# Patient Record
Sex: FEMALE | Race: WHITE | Employment: FULL TIME | URBAN - METROPOLITAN AREA
[De-identification: names, ages, dates, MRNs, and addresses within clinical notes are randomized per-mention and may not be internally consistent; named-entity substitution may affect disease eponyms.]

---

## 2020-10-08 ENCOUNTER — TELEPHONE (OUTPATIENT)
Dept: TRANSPLANT | Facility: CLINIC | Age: 60
End: 2020-10-08

## 2020-10-08 DIAGNOSIS — C92.02 ACUTE MYELOID LEUKEMIA IN RELAPSE (H): Primary | ICD-10-CM

## 2020-10-15 ENCOUNTER — ALLIED HEALTH/NURSE VISIT (OUTPATIENT)
Dept: TRANSPLANT | Facility: CLINIC | Age: 60
End: 2020-10-15
Attending: INTERNAL MEDICINE
Payer: COMMERCIAL

## 2020-10-15 VITALS — HEIGHT: 66 IN

## 2020-10-15 DIAGNOSIS — C92.02 ACUTE MYELOID LEUKEMIA IN RELAPSE (H): Primary | ICD-10-CM

## 2020-10-15 DIAGNOSIS — Z71.9 VISIT FOR COUNSELING: Primary | ICD-10-CM

## 2020-10-15 PROCEDURE — 99207 PR NO CHARGE LOS: CPT

## 2020-10-15 PROCEDURE — 99205 OFFICE O/P NEW HI 60 MIN: CPT | Performed by: INTERNAL MEDICINE

## 2020-10-15 PROCEDURE — G0463 HOSPITAL OUTPT CLINIC VISIT: HCPCS

## 2020-10-15 PROCEDURE — 999N000104 HC STATISTIC NO CHARGE: Mod: TEL

## 2020-10-15 PROCEDURE — 99207 PR NO CHARGE NURSE ONLY: CPT | Mod: TEL

## 2020-10-15 RX ORDER — ACYCLOVIR 400 MG/1
TABLET ORAL
COMMUNITY
Start: 2020-08-06

## 2020-10-15 RX ORDER — LEVOFLOXACIN 500 MG/1
500 TABLET, FILM COATED ORAL
COMMUNITY
Start: 2020-08-20

## 2020-10-15 RX ORDER — PENICILLIN V POTASSIUM 500 MG/1
500 TABLET, FILM COATED ORAL
COMMUNITY
Start: 2020-05-06

## 2020-10-15 RX ORDER — POSACONAZOLE 100 MG/1
200 TABLET, DELAYED RELEASE ORAL
COMMUNITY
Start: 2020-04-16

## 2020-10-15 RX ORDER — POTASSIUM CHLORIDE 1.5 G/1.58G
20 POWDER, FOR SOLUTION ORAL
COMMUNITY

## 2020-10-15 RX ORDER — ATOVAQUONE 750 MG/5ML
1500 SUSPENSION ORAL
COMMUNITY
Start: 2020-04-01

## 2020-10-15 RX ORDER — HYDROXYUREA 500 MG/1
500 CAPSULE ORAL
COMMUNITY
Start: 2020-10-09

## 2020-10-15 RX ORDER — CHLORHEXIDINE GLUCONATE ORAL RINSE 1.2 MG/ML
15 SOLUTION DENTAL
COMMUNITY
Start: 2020-08-19

## 2020-10-15 RX ORDER — BUDESONIDE 3 MG/1
CAPSULE, COATED PELLETS ORAL
COMMUNITY
Start: 2020-10-10 | End: 2020-10-15

## 2020-10-15 RX ORDER — DEXAMETHASONE 0.5 MG/5ML
SOLUTION ORAL
COMMUNITY
Start: 2020-06-12

## 2020-10-15 RX ORDER — HYDROCORTISONE 5 MG/1
TABLET ORAL
COMMUNITY
Start: 2020-09-28

## 2020-10-15 RX ORDER — ALLOPURINOL 300 MG/1
300 TABLET ORAL
COMMUNITY
Start: 2020-10-10 | End: 2021-10-10

## 2020-10-15 RX ORDER — URSODIOL 300 MG/1
CAPSULE ORAL
COMMUNITY
Start: 2019-12-09

## 2020-10-15 NOTE — PROGRESS NOTES
Blood and Marrow Transplant   New Transplant Visit with   Clinical     Leola Fuentes  10/15/2020    New Transplant MD: Michelle Stringer MD  New Transplant NC: Vanna Hyde RN    With whom do you live: with daughter    Relocation Requirement:   Leola lives e hours 47 minutes / 241 miles from [facility] and will be required to relocate post-transplant.     Diagnosis: AML    Transplant Type: TRIKE    Family Information  Next of Kin: Sakina Lake    Phone Number: [phone]    Siblings: brother (lives in [place], [place]; may be able to help as a caregiver on the weekends)    Children: Sakina Lake (daughter) and Melina Mcdonald (daughter)    Grandchildren: 4 (ages 7, 10, 15, and 17; they do not have any questions for patient about what is happening for her - she believes that they are speaking with their parents about any questions they have; offered written material on how to talk with kids about cancer if she would like)    Employment  Leola has not worked in her role as a  since 6.22.2019. She applied for SSDI with a date of disability of 12.2019. She has now  from [employer] and is able to pay for COBRA for 18 months. She is eligible for Medicare in 12.2021.     Source of Income: SSDI    Do you have concerns or questions about finances or insurance related to BMT?: no - we did talk about the option of [facility] Co-Pay Assistance to help with the ~$600/month COBRA premium. She will consider applying for this program.     IMM will be required during hospitalization?: No    Have you completed a health care directive?: We discussed the [facility] policy in the absence of a document we would go to her legal NOK for any medical decisions ONLY IF the patient is unable to make these decisions themselves. Her legal NOK would be her 2 daughters Sakina and Melina.  Patient endorsed being in agreement with this plan.     Provided education and Declined completing    Support:  Is there a network of people who  are available to support you and/or your family?: Due to distance to her home she will work on the options she has for folks who could assist.     Education Provided:   Caregiver Requirement/Role  BMT Packet provided  BMT Book provided  HCD information and blank document  Local lodging  [place]  Support resources  Description of Inpatient Unit    Comments: Spoke with patient Leola and her friend Marguerite in person at the [facility]. Leola shared that she will look into hotels locally for the 2 weekends that she would need to stay locally (between weeks 1 and 2 and 2 and 3). She will also work on finding a caregiver as well. Leola was taking notes during our discussion in order to remember the details surrounding the caregiver and local lodging.  She was ready to leave at the end of our conversation - encouraged her to call with questions or concerns as they arise. The plan is for her to return the week of 10.26 or 11.2 for her 3 day Work-Up.     Janina JASON [facility]    Clinical   10/15/2020  [facility]  Adult Blood and Marrow Transplant Program  [address] [place] [id number]  [email or web address]  [email or web address]  Office: [phone]   Pager: [phone]

## 2020-10-15 NOTE — LETTER
10/15/2020         RE: [address] Fuentes  [address] [address]        Dear Colleague,    Thank you for referring your patient, Leola Fuentes, to the [facility] BLOOD AND MARROW TRANSPLANT PROGRAM [place]. Please see a copy of my visit note below.    Spoke with patient over the phone following Dr. Stringer's MD BMT New Evaluation visit.  Discussed the role of Nurse Coordinator and provided patient with phone number for the BMT office to be used should additional questions arise.  Reviewed next steps, transplant workup, and general after transplant care guidelines.  All questions and concerns addressed, no further questions at this time.  Vanna Hyde, RN, BSN  Nurse Coordinator, Adult BMT      Again, thank you for allowing me to participate in the care of your patient.        Sincerely,        BMT Nurse Coordinator

## 2020-10-15 NOTE — NURSING NOTE
"Oncology Rooming Note    October 15, 2020 12:55 PM   Leola Fuentes is a 59 year old female who presents for:    Chief Complaint   Patient presents with     Oncology Clinic Visit     Patient with AML here for provider consult      Initial Vitals: Ht 1.676 m (5' 6\")  There is no height or weight on file to calculate BMI. There is no height or weight on file to calculate BSA.  Data Unavailable Comment: Data Unavailable   No LMP recorded.  Allergies reviewed: Yes  Medications reviewed: Yes    Medications: Medication refills not needed today.  Pharmacy name entered into EPIC: Data Unavailable    Clinical concerns:       Maggie Palomares CMA              "

## 2020-10-15 NOTE — LETTER
10/15/2020         RE: Leola Fuentes  [address] [place] [address]        Dear Colleague,    Thank you for referring your patient, Leola Fuentes, to the [facility] BLOOD AND MARROW TRANSPLANT PROGRAM [place]. Please see a copy of my visit note below.    BMT Clinic New Patient Consult  Oct 15, 2020    Reason for Visit: Consultation for Clinical Trials/TRIKE for relapsed, refractory AML    Disease and Treatment History:  1. Diagnosed with AML with complex cytogenetics ( 43-44 XX -8, add5q, dic (12;1), add 17p, etc and TP53 mutation in 6/2019 with 57% blasts at diagnosis  2. 6/2019: Induction with 7+3    - Day 14 Bone Marrow (7/12/19) Persistent AML with 10-15% blalsts  2nd Induction with 7+3  -- Day 14 Bone Marrow 7/30/2019: Hypocellular with < 5% blasts  -- Count recovery bone marrow 9/9/2019: Normocellular with TLH and no AML. Cytogenetics normal and TP53 undetected --> CR1  3. HIDAC Consolidation  -- Follow-up bone marrow 10/4/2019: Ongoing CR1  4. NMA MUD Stem Cell Transplant: 11/15/2019  5. Day +28 post transplant marrow: 12/30/2019: No definitive AML. Low level recipient chimerism and 1 cell with complex cytogenetics concerning for early cytogenetics relapse  6. Low Dose Azacitidine 1/2020 - 2/2020:  7. Follow-up Marrow: 4/1/2020: Hypercellular with 10% blasts consistent with Morphologic Relapse  8. Re-induction with Decitabine + Venetoclax starting 4/2020 X 3 cycles  -- 7/6/20 Bone Marrow Biopsy: 20% cellular with no AML consistent with CR2  9. Unable to get more Decitabine + Venetoclax since 7/2020 due to low platelets  10. Repeat marrow 10/5/2020: AML relapse with 70% blasts, complex cytogenetics returned and 60% recipient  -- FLT-3 negative  11. Admit 10/5 - 10/10 due to elevated LFTs. RUQ ultrasound negative. Started on ursodiol. Briefly placed on budesonide 3 mg daily  on discharge 10/10 for ? Of GVHD but no GI symptoms.     HPI: Leola and her friend present today to review options  for clinical trials. She notes in general ROS that her biggest issue is fatigue. She notes no recent infections and no fevers. She notes no bleeding. She notes no headache, no change in vision, no rash, no abdominal pain, no diarrhea, no other GVHD type symptoms beyond a dry mouth that she did some oral swish and spit for and it resolved. She is very motivated to find a trial for her leukemia.    10 point ROS otherwise negative    Past Medical History:  1. AML see HPI (post 7+3 X 2, HIDAC, stem cell transplant, Azacitidine, Decitabine + venetoclax and now on hydrea)  2. History of cellulitis  3. History of tobacco use  4. History of CMV  5. History of elevated LFTs    Meds:  PenVK  Acyclovir 400 BID  Noxafil 200 mg daily  Levaquin 500 mg daily  Oral Dex swish and spit prn  Potassium 20 mEq BID  Cortef 10 mg in the am and 5 mg in the afternoon for adrenal insufficiency  Hydrea 500 mg twice a day  Allopurinol 300 mg daily  Budesonide 6 mg daily (added for ? GVHD on 10/10 and stopped 10/15 due to no GVHD GI symptoms)    Social History: . Exercises on regular basis. No current tobacco. Former smoker quite in 2013. ETOH a few times a month    Family History: Non-contributory    Physical Exam:  Can't find documented vital signs  Gen: Stable, non-toxic appearing woman. KPS 80  HEENT: no icterus or injection  Ext: no edema and no rash    Labs:  Results from Care Everywhere reviewed    10/14/20:  WBC 25.7  Hgb 8.9  Platelets 39k  Blasts 50%    Creatinine 1.03  ALT: 75  AST: 75  Bilirubin 1.9 - 2    9/22 Cortisol 3    A/P: 60 yo woman with high risk AML with TP53 and complex cytogenetics who achieved a CR1 after induction X 2 and then underwent HIDAC and MIKAELA unrelated transplant with unfortunately early cytogenetic relapse 1 month later and full blown morphologic relapse 3 months later despite early azacitidine intervention. Then achieved very brief CR after reinduction attempt with Decitabine + Venetoclax and now  with subsequent relapse on Hydrea.    1. Relapsed/Refractory AML:    Here to discuss TRIKE trial. Reviewed the trial details. Specifically this is a first in human, phase I dose escalation study evaluating the safety and efficacy of a tri-specific killer engager (TRIKE) [id number] for CD33 positive AML/MDS/mastocytosis. We reviewed the drug specifics, we reviewed the dosing and schedule of 3 blocks of treatment that consist of a 96 hour continuous infusion Monday through Friday inpatient with time off over the weekend. We reviewed that given the phase I and first in human nature, that the initial doses begin very low and are increased as we document tolerability and safety. We reviewed that the dose needed for response is unknown so it is not clear at what dose level we would expect to see a response. We reviewed the study specific testing of pharmacokinetics, EKGs, monitoring that is required within the study. We discussed the need to remain locally over the weekend and have a caregiver.     I reviewed with her the details of the 6 patient outcomes that have occurred thus far at the initial 3 dose levels. In short, no significant toxicity noted, patients have all felt well on the therapy, noted 1 patient with stable leukemia and improved transfusion requirements, another patient with decrease in blast percentage. Overall this has been incredibly well tolerated therapy with low risk of toxicity and we are starting to see hints of disease response. Leola and her friend asked many good questions and had their questions answered. Leola was given an opportunity to read through the consent and felt comfortable signing the consent so we could move forward with insurance clearance to determine if we could move forward with work-up. She was given a copy of the consent for her records.    In terms of preliminary eligibility. She meets disease eligibility. Her bilirubin is just barely above the eligibility (needs to be < or =  "1.5 ULN which would be 1.8). Creatinine overall has been at eligibility. No recent PFTs with DLCO so unknown pulmonary eligibility. No recent Echo (last at transplant 11/2019). Her absolute lymphocyte count far exceeds the required 0.2 (at 8) on last check.    She was placed on budesonide briefly from 10/10 - 10/15/20 during hospitalization with ? Of GVHD. Per protocol she needs to be off immunosuppression for 4 weeks. I am not convinced that a short 5 day stint of budesonide would qualify as systemic immunosuppression as budesonide is more for gut direct topical GVHD and not \"systemicly absorbed\" and it was a brief stint. I will confirm this with the Sponsor and Medical Monitor of the study.    In addition, she is on hydrocortisone replacement for adrenal insufficiency. The study prohibits or discourages the use of steroids for treatment of toxicity due to impact on NK cells. However, hydrocortisone replacement therapy at physiologic doses is not listed as an exclusion factor. I have discussed this point with the Medical Monitor/MD and have received their approval of this.     Thus, overall, if her bilirubin improves slightly then her initial review would meet eligibility. She would like to move forward with testing for eligibility once insurance approval is confirmed.     We reviewed that the next spot available would be as early as 11/2 thus a work-up the week of 10/26/20 would be ideal.     If she does move forward we would need to change some of her medications. Specifically her Noxafil would need to transition either to Cresemba or micafungin. In addition we would need to transition her Levaquin to Vantin. Both changes required due to protocol requirements to be off any potentially QT prolonging medications.    2. Renal: maintain good hydration  - continue allopurinol for tumor lysis prophylaxis and kidney protection  - recommend frequent uric acid checks to monitor and use of rasburicase if needed to bring " "down uric acid beyond what the allopurinol is doing    3. ID: currently on acyclovir, noxafil, levaquin, PenVk    4. GVHD: no obvious GVHD by symptoms. Prior oral GVHD and intermittent topical dex swish and spit (doesn't qualify as systemic immunosuppression) and prior consistent use of budesonide but recent 5 day daily stint (again more GI topical then systemic but will confirm with Sponsor/Medical Monitor). Thus, likely fine to move ahead as early as 11/2 or 11/9    Final Plans:  - Investigate insurance approval for TRIKE treatment  - Continue hydrea, current medications, and labs and transfusions with local oncologist, Dr. Cisneros  - If we get financial clearance then proceed with work-up for TRIKE the week of 10/26/20    60 minutes spent with the patient with an additional 20 minutes in care coordination.    Michelle Stringer MD    Addendum: 10/19/20:    Reviewed use of budesonide with medical monitor for [employer] and he did not believe that it was \"systemic immunosuppression\" since it is meant to be a GI topical and has minimal absorption. Thus, from an immunosuppression standpoint she meets criteria.    I was informed that her insurance approved TRIKE so the office was looking to start work-up the week of 10/26/20. I called her with this information and learned that she was admitted 10/16/20 with a neutropenic fever. CT chest showed no infiltrate but did show new small non-occlusive PE's bilaterally. She was started on some lovenox for this and platelets being maintained above 50k.    This morning she said she was feeling much better and was waiting for Dr. Cisneros to come by and chat with her. I did have a chance to chat with Dr. Cisneros and informed him of the study details and the timeline to work-up. We reviewed that she could likely start work-up the week of 10/26/20, we reviewed that Hydrea is fine to control her WBC, we reviewed my recommendation to hold her noxafil given no fungal appearing infiltrate and " mildly elevated bililrubin.. He was going to talk with her today and she will make a final decision moving forward.    Michelle Stringer MD

## 2020-10-16 NOTE — PROGRESS NOTES
BMT Clinic New Patient Consult  Oct 15, 2020    Reason for Visit: Consultation for Clinical Trials/TRIKE for relapsed, refractory AML    Disease and Treatment History:  1. Diagnosed with AML with complex cytogenetics ( 43-44 XX -8, add5q, dic (12;1), add 17p, etc and TP53 mutation in 6/2019 with 57% blasts at diagnosis  2. 6/2019: Induction with 7+3    - Day 14 Bone Marrow (7/12/19) Persistent AML with 10-15% blalsts  2nd Induction with 7+3  -- Day 14 Bone Marrow 7/30/2019: Hypocellular with < 5% blasts  -- Count recovery bone marrow 9/9/2019: Normocellular with TLH and no AML. Cytogenetics normal and TP53 undetected --> CR1  3. HIDAC Consolidation  -- Follow-up bone marrow 10/4/2019: Ongoing CR1  4. [facility] Stem Cell Transplant: 11/15/2019  5. Day +28 post transplant marrow: 12/30/2019: No definitive AML. Low level recipient chimerism and 1 cell with complex cytogenetics concerning for early cytogenetics relapse  6. Low Dose Azacitidine 1/2020 - 2/2020:  7. Follow-up Marrow: 4/1/2020: Hypercellular with 10% blasts consistent with Morphologic Relapse  8. Re-induction with Decitabine + Venetoclax starting 4/2020 X 3 cycles  -- 7/6/20 Bone Marrow Biopsy: 20% cellular with no AML consistent with CR2  9. Unable to get more Decitabine + Venetoclax since 7/2020 due to low platelets  10. Repeat marrow 10/5/2020: AML relapse with 70% blasts, complex cytogenetics returned and 60% recipient  -- FLT-3 negative  11. Admit 10/5 - 10/10 due to elevated LFTs. RUQ ultrasound negative. Started on ursodiol. Briefly placed on budesonide 3 mg daily  on discharge 10/10 for ? Of GVHD but no GI symptoms.     HPI: Leola and her friend present today to review options for clinical trials. She notes in general ROS that her biggest issue is fatigue. She notes no recent infections and no fevers. She notes no bleeding. She notes no headache, no change in vision, no rash, no abdominal pain, no diarrhea, no other GVHD type symptoms beyond a dry  mouth that she did some oral swish and spit for and it resolved. She is very motivated to find a trial for her leukemia.    10 point ROS otherwise negative    Past Medical History:  1. AML see HPI (post 7+3 X 2, HIDAC, stem cell transplant, Azacitidine, Decitabine + venetoclax and now on hydrea)  2. History of cellulitis  3. History of tobacco use  4. History of CMV  5. History of elevated LFTs    Meds:  PenVK  Acyclovir 400 BID  Noxafil 200 mg daily  Levaquin 500 mg daily  Oral Dex swish and spit prn  Potassium 20 mEq BID  Cortef 10 mg in the am and 5 mg in the afternoon for adrenal insufficiency  Hydrea 500 mg twice a day  Allopurinol 300 mg daily  Budesonide 6 mg daily (added for ? GVHD on 10/10 and stopped 10/15 due to no GVHD GI symptoms)    Social History: . Exercises on regular basis. No current tobacco. Former smoker quite in 2013. ETOH a few times a month    Family History: Non-contributory    Physical Exam:  Can't find documented vital signs  Gen: Stable, non-toxic appearing woman. KPS 80  HEENT: no icterus or injection  Ext: no edema and no rash    Labs:  Results from Care Everywhere reviewed    10/14/20:  WBC 25.7  Hgb 8.9  Platelets 39k  Blasts 50%    Creatinine 1.03  ALT: 75  AST: 75  Bilirubin 1.9 - 2    9/22 Cortisol 3    A/P: 58 yo woman with high risk AML with TP53 and complex cytogenetics who achieved a CR1 after induction X 2 and then underwent HIDAC and MIKAELA unrelated transplant with unfortunately early cytogenetic relapse 1 month later and full blown morphologic relapse 3 months later despite early azacitidine intervention. Then achieved very brief CR after reinduction attempt with Decitabine + Venetoclax and now with subsequent relapse on Hydrea.    1. Relapsed/Refractory AML:    Here to discuss TRIKE trial. Reviewed the trial details. Specifically this is a first in human, phase I dose escalation study evaluating the safety and efficacy of a tri-specific killer engager (TRIKE)  [id number] for CD33 positive AML/MDS/mastocytosis. We reviewed the drug specifics, we reviewed the dosing and schedule of 3 blocks of treatment that consist of a 96 hour continuous infusion Monday through Friday inpatient with time off over the weekend. We reviewed that given the phase I and first in human nature, that the initial doses begin very low and are increased as we document tolerability and safety. We reviewed that the dose needed for response is unknown so it is not clear at what dose level we would expect to see a response. We reviewed the study specific testing of pharmacokinetics, EKGs, monitoring that is required within the study. We discussed the need to remain locally over the weekend and have a caregiver.     I reviewed with her the details of the 6 patient outcomes that have occurred thus far at the initial 3 dose levels. In short, no significant toxicity noted, patients have all felt well on the therapy, noted 1 patient with stable leukemia and improved transfusion requirements, another patient with decrease in blast percentage. Overall this has been incredibly well tolerated therapy with low risk of toxicity and we are starting to see hints of disease response. Leola and her friend asked many good questions and had their questions answered. Leola was given an opportunity to read through the consent and felt comfortable signing the consent so we could move forward with insurance clearance to determine if we could move forward with work-up. She was given a copy of the consent for her records.    In terms of preliminary eligibility. She meets disease eligibility. Her bilirubin is just barely above the eligibility (needs to be < or = 1.5 ULN which would be 1.8). Creatinine overall has been at eligibility. No recent PFTs with [facility]CO so unknown pulmonary eligibility. No recent Echo (last at transplant 11/2019). Her absolute lymphocyte count far exceeds the required 0.2 (at 8) on last check.    She was  "placed on budesonide briefly from 10/10 - 10/15/20 during hospitalization with ? Of GVHD. Per protocol she needs to be off immunosuppression for 4 weeks. I am not convinced that a short 5 day stint of budesonide would qualify as systemic immunosuppression as budesonide is more for gut direct topical GVHD and not \"systemicly absorbed\" and it was a brief stint. I will confirm this with the Sponsor and Medical Monitor of the study.    In addition, she is on hydrocortisone replacement for adrenal insufficiency. The study prohibits or discourages the use of steroids for treatment of toxicity due to impact on NK cells. However, hydrocortisone replacement therapy at physiologic doses is not listed as an exclusion factor. I have discussed this point with the Medical Monitor/MD and have received their approval of this.     Thus, overall, if her bilirubin improves slightly then her initial review would meet eligibility. She would like to move forward with testing for eligibility once insurance approval is confirmed.     We reviewed that the next spot available would be as early as 11/2 thus a work-up the week of 10/26/20 would be ideal.     If she does move forward we would need to change some of her medications. Specifically her Noxafil would need to transition either to Cresemba or micafungin. In addition we would need to transition her Levaquin to Vantin. Both changes required due to protocol requirements to be off any potentially QT prolonging medications.    2. Renal: maintain good hydration  - continue allopurinol for tumor lysis prophylaxis and kidney protection  - recommend frequent uric acid checks to monitor and use of rasburicase if needed to bring down uric acid beyond what the allopurinol is doing    3. ID: currently on acyclovir, noxafil, levaquin, PenVk    4. GVHD: no obvious GVHD by symptoms. Prior oral GVHD and intermittent topical dex swish and spit (doesn't qualify as systemic immunosuppression) and prior " "consistent use of budesonide but recent 5 day daily stint (again more GI topical then systemic but will confirm with Sponsor/Medical Monitor). Thus, likely fine to move ahead as early as 11/2 or 11/9    Final Plans:  - Investigate insurance approval for TRIKE treatment  - Continue hydrea, current medications, and labs and transfusions with local oncologist, Dr. Cisneros  - If we get financial clearance then proceed with work-up for TRIKE the week of 10/26/20    60 minutes spent with the patient with an additional 20 minutes in care coordination.    Michelle Stringer MD    Addendum: 10/19/20:    Reviewed use of budesonide with medical monitor for [employer] and he did not believe that it was \"systemic immunosuppression\" since it is meant to be a GI topical and has minimal absorption. Thus, from an immunosuppression standpoint she meets criteria.    I was informed that her insurance approved TRIKE so the office was looking to start work-up the week of 10/26/20. I called her with this information and learned that she was admitted 10/16/20 with a neutropenic fever. CT chest showed no infiltrate but did show new small non-occlusive PE's bilaterally. She was started on some lovenox for this and platelets being maintained above 50k.    This morning she said she was feeling much better and was waiting for Dr. Cisneros to come by and chat with her. I did have a chance to chat with Dr. Cisneros and informed him of the study details and the timeline to work-up. We reviewed that she could likely start work-up the week of 10/26/20, we reviewed that Hydrea is fine to control her WBC, we reviewed my recommendation to hold her noxafil given no fungal appearing infiltrate and mildly elevated bililrubin.. He was going to talk with her today and she will make a final decision moving forward.    Michelle Stringer MD          "

## 2020-10-16 NOTE — PROGRESS NOTES
Spoke with patient over the phone following Dr. Stringer's MD BMT New Evaluation visit.  Discussed the role of Nurse Coordinator and provided patient with phone number for the BMT office to be used should additional questions arise.  Reviewed next steps, transplant workup, and general after transplant care guidelines.  All questions and concerns addressed, no further questions at this time.  Vanna Hyde, RN, BSN  Nurse Coordinator, Adult BMT

## 2020-10-19 DIAGNOSIS — Z86.2 PERSONAL HISTORY OF DISEASES OF BLOOD AND BLOOD-FORMING ORGANS: ICD-10-CM

## 2020-10-19 DIAGNOSIS — C92.02 ACUTE MYELOID LEUKEMIA IN RELAPSE (H): Primary | ICD-10-CM

## 2020-10-20 ENCOUNTER — HOSPITAL ENCOUNTER (OUTPATIENT)
Facility: AMBULATORY SURGERY CENTER | Age: 60
End: 2020-10-20
Attending: RADIOLOGY
Payer: COMMERCIAL

## 2020-10-20 DIAGNOSIS — Z11.59 ENCOUNTER FOR SCREENING FOR OTHER VIRAL DISEASES: Primary | ICD-10-CM

## 2020-10-20 DIAGNOSIS — C92.02 ACUTE MYELOID LEUKEMIA IN RELAPSE (H): ICD-10-CM

## 2020-10-21 ENCOUNTER — HOSPITAL ENCOUNTER (OUTPATIENT)
Facility: AMBULATORY SURGERY CENTER | Age: 60
End: 2020-10-21
Attending: NURSE PRACTITIONER
Payer: COMMERCIAL

## 2020-10-21 PROBLEM — C92.02 ACUTE MYELOID LEUKEMIA IN RELAPSE (H): Status: ACTIVE | Noted: 2020-10-21

## 2020-10-22 ENCOUNTER — MEDICAL CORRESPONDENCE (OUTPATIENT)
Dept: TRANSPLANT | Facility: CLINIC | Age: 60
End: 2020-10-22

## 2020-10-26 ENCOUNTER — MEDICAL CORRESPONDENCE (OUTPATIENT)
Dept: TRANSPLANT | Facility: CLINIC | Age: 60
End: 2020-10-26

## 2021-01-04 ENCOUNTER — HEALTH MAINTENANCE LETTER (OUTPATIENT)
Age: 61
End: 2021-01-04

## 2021-10-11 ENCOUNTER — HEALTH MAINTENANCE LETTER (OUTPATIENT)
Age: 61
End: 2021-10-11

## 2022-01-30 ENCOUNTER — HEALTH MAINTENANCE LETTER (OUTPATIENT)
Age: 62
End: 2022-01-30

## 2022-09-24 ENCOUNTER — HEALTH MAINTENANCE LETTER (OUTPATIENT)
Age: 62
End: 2022-09-24

## 2023-01-29 ENCOUNTER — HEALTH MAINTENANCE LETTER (OUTPATIENT)
Age: 63
End: 2023-01-29

## 2023-05-08 ENCOUNTER — HEALTH MAINTENANCE LETTER (OUTPATIENT)
Age: 63
End: 2023-05-08

## 2023-12-23 ENCOUNTER — HEALTH MAINTENANCE LETTER (OUTPATIENT)
Age: 63
End: 2023-12-23